# Patient Record
Sex: MALE | Race: WHITE | URBAN - METROPOLITAN AREA
[De-identification: names, ages, dates, MRNs, and addresses within clinical notes are randomized per-mention and may not be internally consistent; named-entity substitution may affect disease eponyms.]

---

## 2020-01-22 ENCOUNTER — OUTPATIENT (OUTPATIENT)
Dept: OUTPATIENT SERVICES | Facility: HOSPITAL | Age: 70
LOS: 1 days | End: 2020-01-22
Payer: MEDICARE

## 2020-01-22 DIAGNOSIS — G47.33 OBSTRUCTIVE SLEEP APNEA (ADULT) (PEDIATRIC): ICD-10-CM

## 2020-01-22 PROCEDURE — 95806 SLEEP STUDY UNATT&RESP EFFT: CPT

## 2020-01-22 PROCEDURE — G0399: CPT

## 2020-01-22 PROCEDURE — 95806 SLEEP STUDY UNATT&RESP EFFT: CPT | Mod: 26

## 2020-04-21 ENCOUNTER — EMERGENCY (EMERGENCY)
Facility: HOSPITAL | Age: 70
LOS: 0 days | Discharge: HOME | End: 2020-04-21
Attending: EMERGENCY MEDICINE
Payer: MEDICARE

## 2020-04-21 VITALS — HEIGHT: 66 IN | WEIGHT: 195.11 LBS

## 2020-04-21 VITALS
OXYGEN SATURATION: 98 % | HEART RATE: 78 BPM | SYSTOLIC BLOOD PRESSURE: 141 MMHG | RESPIRATION RATE: 17 BRPM | TEMPERATURE: 99 F | DIASTOLIC BLOOD PRESSURE: 77 MMHG

## 2020-04-21 DIAGNOSIS — N40.0 BENIGN PROSTATIC HYPERPLASIA WITHOUT LOWER URINARY TRACT SYMPTOMS: ICD-10-CM

## 2020-04-21 DIAGNOSIS — Z88.8 ALLERGY STATUS TO OTHER DRUGS, MEDICAMENTS AND BIOLOGICAL SUBSTANCES STATUS: ICD-10-CM

## 2020-04-21 DIAGNOSIS — E78.5 HYPERLIPIDEMIA, UNSPECIFIED: ICD-10-CM

## 2020-04-21 DIAGNOSIS — I62.00 NONTRAUMATIC SUBDURAL HEMORRHAGE, UNSPECIFIED: ICD-10-CM

## 2020-04-21 DIAGNOSIS — Z79.82 LONG TERM (CURRENT) USE OF ASPIRIN: ICD-10-CM

## 2020-04-21 DIAGNOSIS — R51 HEADACHE: ICD-10-CM

## 2020-04-21 LAB
ALBUMIN SERPL ELPH-MCNC: 3.7 G/DL — SIGNIFICANT CHANGE UP (ref 3.3–5)
ALP SERPL-CCNC: 73 U/L — SIGNIFICANT CHANGE UP (ref 40–120)
ALT FLD-CCNC: 42 U/L — SIGNIFICANT CHANGE UP (ref 12–78)
ANION GAP SERPL CALC-SCNC: 4 MMOL/L — LOW (ref 5–17)
APTT BLD: 31.4 SEC — SIGNIFICANT CHANGE UP (ref 27.5–36.3)
AST SERPL-CCNC: 25 U/L — SIGNIFICANT CHANGE UP (ref 15–37)
BASOPHILS # BLD AUTO: 0.04 K/UL — SIGNIFICANT CHANGE UP (ref 0–0.2)
BASOPHILS NFR BLD AUTO: 0.6 % — SIGNIFICANT CHANGE UP (ref 0–2)
BILIRUB SERPL-MCNC: 0.5 MG/DL — SIGNIFICANT CHANGE UP (ref 0.2–1.2)
BUN SERPL-MCNC: 19 MG/DL — SIGNIFICANT CHANGE UP (ref 7–23)
CALCIUM SERPL-MCNC: 9 MG/DL — SIGNIFICANT CHANGE UP (ref 8.5–10.1)
CHLORIDE SERPL-SCNC: 109 MMOL/L — HIGH (ref 96–108)
CO2 SERPL-SCNC: 27 MMOL/L — SIGNIFICANT CHANGE UP (ref 22–31)
CREAT SERPL-MCNC: 0.96 MG/DL — SIGNIFICANT CHANGE UP (ref 0.5–1.3)
EOSINOPHIL # BLD AUTO: 0.15 K/UL — SIGNIFICANT CHANGE UP (ref 0–0.5)
EOSINOPHIL NFR BLD AUTO: 2.1 % — SIGNIFICANT CHANGE UP (ref 0–6)
GLUCOSE SERPL-MCNC: 89 MG/DL — SIGNIFICANT CHANGE UP (ref 70–99)
HCT VFR BLD CALC: 45.8 % — SIGNIFICANT CHANGE UP (ref 39–50)
HGB BLD-MCNC: 15.4 G/DL — SIGNIFICANT CHANGE UP (ref 13–17)
IMM GRANULOCYTES NFR BLD AUTO: 0.7 % — SIGNIFICANT CHANGE UP (ref 0–1.5)
INR BLD: 1.1 RATIO — SIGNIFICANT CHANGE UP (ref 0.88–1.16)
LYMPHOCYTES # BLD AUTO: 1.64 K/UL — SIGNIFICANT CHANGE UP (ref 1–3.3)
LYMPHOCYTES # BLD AUTO: 23.3 % — SIGNIFICANT CHANGE UP (ref 13–44)
MCHC RBC-ENTMCNC: 32.1 PG — SIGNIFICANT CHANGE UP (ref 27–34)
MCHC RBC-ENTMCNC: 33.6 GM/DL — SIGNIFICANT CHANGE UP (ref 32–36)
MCV RBC AUTO: 95.4 FL — SIGNIFICANT CHANGE UP (ref 80–100)
MONOCYTES # BLD AUTO: 0.76 K/UL — SIGNIFICANT CHANGE UP (ref 0–0.9)
MONOCYTES NFR BLD AUTO: 10.8 % — SIGNIFICANT CHANGE UP (ref 2–14)
NEUTROPHILS # BLD AUTO: 4.4 K/UL — SIGNIFICANT CHANGE UP (ref 1.8–7.4)
NEUTROPHILS NFR BLD AUTO: 62.5 % — SIGNIFICANT CHANGE UP (ref 43–77)
PLATELET # BLD AUTO: 237 K/UL — SIGNIFICANT CHANGE UP (ref 150–400)
POTASSIUM SERPL-MCNC: 4.4 MMOL/L — SIGNIFICANT CHANGE UP (ref 3.5–5.3)
POTASSIUM SERPL-SCNC: 4.4 MMOL/L — SIGNIFICANT CHANGE UP (ref 3.5–5.3)
PROT SERPL-MCNC: 6.9 GM/DL — SIGNIFICANT CHANGE UP (ref 6–8.3)
PROTHROM AB SERPL-ACNC: 12.3 SEC — SIGNIFICANT CHANGE UP (ref 10–12.9)
RBC # BLD: 4.8 M/UL — SIGNIFICANT CHANGE UP (ref 4.2–5.8)
RBC # FLD: 12.7 % — SIGNIFICANT CHANGE UP (ref 10.3–14.5)
SODIUM SERPL-SCNC: 140 MMOL/L — SIGNIFICANT CHANGE UP (ref 135–145)
WBC # BLD: 7.04 K/UL — SIGNIFICANT CHANGE UP (ref 3.8–10.5)
WBC # FLD AUTO: 7.04 K/UL — SIGNIFICANT CHANGE UP (ref 3.8–10.5)

## 2020-04-21 PROCEDURE — 36415 COLL VENOUS BLD VENIPUNCTURE: CPT

## 2020-04-21 PROCEDURE — 70450 CT HEAD/BRAIN W/O DYE: CPT

## 2020-04-21 PROCEDURE — 85025 COMPLETE CBC W/AUTO DIFF WBC: CPT

## 2020-04-21 PROCEDURE — 99285 EMERGENCY DEPT VISIT HI MDM: CPT

## 2020-04-21 PROCEDURE — 93005 ELECTROCARDIOGRAM TRACING: CPT

## 2020-04-21 PROCEDURE — 80053 COMPREHEN METABOLIC PANEL: CPT

## 2020-04-21 PROCEDURE — 85610 PROTHROMBIN TIME: CPT

## 2020-04-21 PROCEDURE — 93010 ELECTROCARDIOGRAM REPORT: CPT

## 2020-04-21 PROCEDURE — 85730 THROMBOPLASTIN TIME PARTIAL: CPT

## 2020-04-21 PROCEDURE — 99283 EMERGENCY DEPT VISIT LOW MDM: CPT

## 2020-04-21 PROCEDURE — 70450 CT HEAD/BRAIN W/O DYE: CPT | Mod: 26

## 2020-04-21 PROCEDURE — 99291 CRITICAL CARE FIRST HOUR: CPT | Mod: 25

## 2020-04-21 NOTE — ED ADULT NURSE NOTE - OBJECTIVE STATEMENT
pt. c/o headache and incoordination since friday. pt. states he was trying to tie his shoes and get dressed and his hands were "not moving the way they normally do, it was like they wouldn't do what I was thinking." pt. states he had outpatient MRI of head today and was told he has bleeding in his brain and needs neurosurgery consult. pt. states he drove himself home from the radiology center and his wife drove him here, pt. is alert and oriented and states his HA has decreased and pain has improved.

## 2020-04-21 NOTE — ED ADULT TRIAGE NOTE - WEIGHT METHOD
HPI     2 wk return for Keratoconus, acute hydrops, OD  Pt states that OD is still the same since last exam. Pt denies any pain or   discomfort at this time. A few days ago OS was light sensitive and started   hurting but today it seems to be fine.      pt confirms using  PF QID OD  Timolol BID OD  Vinnie BID OD       Last edited by Karlie Bauer on 11/20/2019 11:10 AM. (History)            Assessment /Plan     For exam results, see Encounter Report.    Keratoconus, acute hydrops, right      Still with prominent hydrops OD  Photos taken today  Continue current treatment/medications                    stated

## 2020-04-21 NOTE — ED PROVIDER NOTE - NSFOLLOWUPINSTRUCTIONS_ED_ALL_ED_FT
Subdural Hematoma     A subdural hematoma is a collection of blood between the brain and its outer covering (dura). As the amount of blood increases, pressure builds on the brain.  There are two types of subdural hematomas:  Acute. This type develops shortly after a hard, direct hit to the head and causes blood to collect very quickly. This is a medical emergency. If it is not diagnosed and treated quickly, it can lead to severe brain injury or death.Chronic. This is when bleeding develops more slowly, over weeks or months. In some cases, this type does not cause symptoms.What are the causes?  This condition is caused by bleeding (hemorrhage) from a broken (ruptured) blood vessel. In most cases, a blood vessel ruptures and bleeds because of a head injury, such as from a hard, direct hit. Head injuries can happen in car accidents, falls, assaults, or while playing sports.  In rare cases, a hemorrhage can happen without a known cause (spontaneously), especially if you take blood thinners (anticoagulants).  What increases the risk?  This condition is more likely to develop in:  Older people.Infants.People who take blood thinners.People who have head injuries.People who abuse alcohol.What are the signs or symptoms?  Symptoms of this condition can vary depending on the size of the hematoma. Symptoms can be mild, severe, or life-threatening. They include:  Headaches.Nausea or vomiting.Changes in vision, such as double vision or loss of vision.Changes in speech or trouble understanding what people say.Loss of balance or trouble walking.Weakness, numbness, or tingling in the arms or legs, especially on one side of the body.Seizures.Change in personality.Increased sleepiness.Memory loss.Loss of consciousness.Coma.Symptoms of acute subdural hematoma can develop over minutes or hours. Symptoms of chronic subdural hematoma may develop over weeks or months.  How is this diagnosed?  This condition is diagnosed based on the results of:  A physical exam. Tests of strength, reflexes, coordination, senses, manner of walking (gait), and facial and eye movements (neurological exam). Imaging tests, such as an MRI or a CT scan.How is this treated?  Treatment for this condition depends on the type of hematoma and how severe it is.  Treatment for acute hematoma may include:  Emergency surgery to drain blood or remove a blood clot.Medicines that help the body get rid of excess fluids (diuretics). These may help to reduce pressure in the brain.Assisted breathing (ventilation).Treatment for chronic hematoma may include:  Observation and bed rest at the hospital.Surgery.If you take blood thinners, you may need to stop taking them for a short time. You may also be given anti-seizure (anticonvulsant) medicine.  Sometimes, no treatment is needed for chronic subdural hematoma.  Follow these instructions at home:  Activity     Avoid situations where you could injure your head again, such as in competitive sports, downhill snow sports, and horseback riding. Do not do these activities until your health care provider approves.  Wear protective gear, such as a helmet, when participating in activities such as biking or contact sports.Avoid too much visual stimulation while recovering. This means limiting how much you read and limiting your screen time on a smart phone, tablet, computer, or TV.Rest as told by your health care provider. Rest helps the brain heal.Try to avoid activities that cause physical or mental stress. Return to work or school as told by your health care provider.Do not lift anything that is heavier than 5 lb (2.3 kg), or the limit you are told, until your health care provider says that it is safe.Do not drive, ride a bike, or use heavy machinery until your health care provider approves.Always wear your seat belt when you are in a motor vehicle.Alcohol use     Do not drink alcohol if your health care provider tells you not to drink.If you drink alcohol, limit how much you use to:  0–1 drink a day for women.0–2 drinks a day for men.General instructions     Monitor your symptoms, and ask people around you to do the same. Recovery from brain injuries varies. Talk with your health care provider about what to expect.Take over-the-counter and prescription medicines only as told by your health care provider. Do not take blood thinners or NSAIDs unless your health care provider approves. These include aspirin, ibuprofen, naproxen, and warfarin.Keep your home environment safe to reduce the risk of falling.Keep all follow-up visits as told by your health care provider. This is important.Where to find more information  National [place] of Neurological Disorders and Stroke: www.ninds.nih.govAmerican Academy of Neurology (AAN): www.aan.comBrain Injury Association of [place]: www.beth.[facility] McDuffieGet help right away if you:  Are taking blood thinners and you fall or you experience minor trauma to the head. If you take any blood thinners, even a very small injury can cause a subdural hematoma.Have a bleeding disorder and you fall or you experience minor trauma to the head.Develop any of the following symptoms after a head injury:  Clear fluid draining from your nose or ears.Nausea or vomiting.Changes in speech or trouble understanding what people say.Seizures.Drowsiness or a decrease in alertness.Double vision.Numbness or inability to move (paralysis) in any part of your body.Difficulty walking or poor coordination.Difficulty thinking.Confusion or forgetfulness.Personality changes.Irrational or aggressive behavior.These symptoms may represent a serious problem that is an emergency. Do not wait to see if the symptoms will go away. Get medical help right away. Call your local emergency services (911 in the [place] Do not drive yourself to the hospital.   Summary  A subdural hematoma is a collection of blood between the brain and its outer covering (dura).Treatment for this condition depends on what type of subdural hematoma you have and how severe it is.Symptoms can vary from mild to severe to life-threatening.Monitor your symptoms, and ask others around you to do the same.This information is not intended to replace advice given to you by your health care provider. Make sure you discuss any questions you have with your health care provider.

## 2020-04-21 NOTE — ED PROVIDER NOTE - PATIENT PORTAL LINK FT
You can access the FollowMyHealth Patient Portal offered by [facility] by registering at the following website: [email or web address]. By joining [employer]’s FollowMyHealth portal, you will also be able to view your health information using other applications (apps) compatible with our system.

## 2020-04-21 NOTE — ED ADULT NURSE NOTE - CHIEF COMPLAINT QUOTE
pt sent to ED by MD Rebollar for neuro consult s/p MRI of head this morning showing + R. sided bleed. pt reports "very bad HA" 4 days ago and a fall x "a few weeks ago." reports he went for the MRI because his "L. arm was not acting like it was part of his body" but has been resolving. Pt AOx4 in triage, on 81 ASA, took this morning. denies HA and vision changes.

## 2020-04-21 NOTE — ED PROVIDER NOTE - CLINICAL SUMMARY MEDICAL DECISION MAKING FREE TEXT BOX
Right sided subdural hematoma with 1.6 cm leftward shift, mild LUE pronator drift for x4 days. Pt otherwise with normal neuro exam and very well appearing. Neurosurgery recommends interval 4 hour CT head. Right sided subdural hematoma with 1.6 cm leftward shift, mild LUE pronator drift for x4 days. Pt otherwise with normal neuro exam and very well appearing. Neurosurgery recommends discharge and outpatient CT head. Right sided subdural hematoma with 1.6 cm leftward shift, mild LUE fine motor impairment x4 days. Pt otherwise with normal neuro exam and very well appearing. Neurosurgery recommends discharge and outpatient CT head.

## 2020-04-21 NOTE — ED PROVIDER NOTE - CARE PROVIDER_API CALL
Avinash Rosenthal; PhD)  Neurosurgery  [address] [facility] [address] [place] [address]  Phone: [phone]  Fax: [phone]  Follow Up Time:

## 2020-04-21 NOTE — ED PROVIDER NOTE - OBJECTIVE STATEMENT
Pertinent HPI/PMH/PSH/FHx/SHx and Review of Systems contained within  HPI: 70 y/o male sent in by neurologist for neurosurgery consult for right subdural hematoma. Pt had a headache x4 days ago. Pt reports he went for the MRI because his "L. arm was not acting like it was part of his body" but has been resolving. Left hand seems to internally rotate itself. Pt fell about 2-3 weeks ago, denies hitting head at that time.   PMH/PSH relevant for: HLD, on 81 mg aspirin and BPH  ROS negative for: fever, Chest pain, SOB, Nausea, vomiting, diarrhea, abdominal pain, dysuria    FamilyHx and SocialHx: pt is a  Pertinent HPI/PMH/PSH/FHx/SHx and Review of Systems contained within  HPI: 68 y/o male sent in by neurologist for neurosurgery consult for right subdural hematoma. Pt had a headache x4 days ago. Pt reports he went for the MRI because his "L. arm was not acting like it was part of his body" but has been resolving. Left hand seems to internally rotate itself & "have a mind of its own". Pt fell about 2-3 weeks ago, denies hitting head at that time.   PMH/PSH relevant for: HLD, on 81 mg aspirin and BPH  ROS negative for: fever, Chest pain, SOB, Nausea, vomiting, diarrhea, abdominal pain, dysuria    FamilyHx and SocialHx: pt is a

## 2020-04-21 NOTE — ED PROVIDER NOTE - PHYSICAL EXAMINATION
*GEN: No acute distress, well appearing   *HEAD: Normocephalic, Atraumatic  *EYES/NOSE: b/l Pupils symmetric & Reactive to ligth, EOMI b/l  *THROAT: airway patent, moist mucous membranes  *NECK: Neck supple  *PULMONARY: No Respiratory distress, symmetric b/l chest rise  *CARDIAC: s1s2, regular rhythm   *ABDOMEN:  Non Tender, Non Distended, soft, no guarding, no rebound, no masses   *BACK: no CVA tenderness, No midline vertebral tenderness to palpation   *EXTREMITIES: symmetric pulses, 2+ DP & radial pulses, no cyanosis, no edema   *SKIN: no rash, no bruising   *NEUROLOGIC: CN 2-12 intact, normal finger to nose & heel to shin; no dysdiadochokinesis; equal & normal strength & sensation in b/l UE/LE; full active & passive ROM in all extremities, +mild pronator drift LUE, 5/5 strength LUE, normal patellar reflex, normal gait, romberg sign negative,  *PSYCH: appropriate concern about symptoms, pleasant *GEN: No acute distress, well appearing   *HEAD: Normocephalic, Atraumatic  *EYES/NOSE: b/l Pupils symmetric & Reactive to ligth, EOMI b/l  *THROAT: airway patent, moist mucous membranes  *NECK: Neck supple  *PULMONARY: No Respiratory distress, symmetric b/l chest rise  *CARDIAC: s1s2, regular rhythm   *ABDOMEN:  Non Tender, Non Distended, soft, no guarding, no rebound, no masses   *BACK: no CVA tenderness, No midline vertebral tenderness to palpation   *EXTREMITIES: symmetric pulses, 2+ DP & radial pulses, no cyanosis, no edema   *SKIN: no rash, no bruising   *NEUROLOGIC: CN 2-12 intact, normal finger to nose & heel to shin; no dysdiadochokinesis; equal & normal strength & sensation in b/l UE/LE; full active & passive ROM in all extremities, no pronator drift 5/5 strength LUE, normal patellar reflex, normal gait, romberg sign negative, L UE scant independent upward motion with eyes closed, no drift no weakness.  challenges with fine motor LUE hand only  *PSYCH: appropriate concern about symptoms, pleasant

## 2020-04-21 NOTE — ED PROVIDER NOTE - PROGRESS NOTE DETAILS
Patient seen and evaluated.  States had outpatient MRI today and was sent in by neurologist, Dr. Pacheco, as he had a bleed visualized on imaging.  He had a HA 5 days ago and noticed his left arm and leg were not functioning as normal, but were still moving.  Overall this symptom has been improving.  Case d/w neurosurgery who were aware of the patient and will be down to see him -Gisell Tijerina PA-C Patient seen here by Dr. Rosenthal from neurosurgery, he clears the patient for d/c and close follow up in the office.  Strict return precautions reviewed -Gisell Tijerina PA-C

## 2020-04-21 NOTE — ED PROVIDER NOTE - ATTENDING CONTRIBUTION TO CARE
Attending Attestation:  Reese Back MD,  I have personally performed a history and physical examination of the patient and discussed management with the ACP as well as the patient.  I reviewed the ACP's note and agree with the documented findings and plan of care.  I have authored and modified critical sections of the Provider Note, including but not limited to HPI, ROS, Physical Exam and MDM.

## 2020-04-21 NOTE — ED PROVIDER NOTE - CARE PLAN
Principal Discharge DX:	Subdural hematoma Principal Discharge DX:	Subdural hematoma  Secondary Diagnosis:	Midline shift of brain due to hematoma  Secondary Diagnosis:	Headache  Secondary Diagnosis:	Fine motor impairment

## 2020-04-21 NOTE — ED ADULT TRIAGE NOTE - CHIEF COMPLAINT QUOTE
pt sent to ED by MD Rebollar for neuro consult s/p MRI of head this morning showing + R. sided bleed. pt reports "very bad HA" 4 days ago and a fall x "a few weeks ago." reports he went for the MRI because his "arm was not acting like it was part of his body" but has been resolving. Pt AOx4 in triage, on 81 ASA, took this morning. denies HA and vision changes. pt sent to ED by MD Rebollar for neuro consult s/p MRI of head this morning showing + R. sided bleed. pt reports "very bad HA" 4 days ago and a fall x "a few weeks ago." reports he went for the MRI because his "L. arm was not acting like it was part of his body" but has been resolving. Pt AOx4 in triage, on 81 ASA, took this morning. denies HA and vision changes.

## 2020-04-21 NOTE — CONSULT NOTE ADULT - SUBJECTIVE AND OBJECTIVE BOX
Neurosurgery:    69M evaluated by Dr. Crawford (Neurologist) after MRI study complete.  Pt c/o HA's.  Pt sent to ED for further eval.  Awaits CTH.  Neurologically bright, alert, oriented, intact.    PAST MEDICAL & SURGICAL HISTORY:      FAMILY HISTORY:  Non-contributory     Social Hx:    No Tob, No EtOH    Allergies  hydrocodone (Hives)          MEDICATIONS  (outpt) pending       ROS: Pertinent positives in HPI, all other ROS were reviewed and are negative.     Review of Symptoms  	Gen: no fevers, chills, sweats, weight loss, fatigue  	Visual: no recent changes in vision, no blurriness, no seeing spots  	Cardiovascular: no chest pain, no palpitations, no orthopnea, no leg swelling  	Respiratory: no shortness of breath, no exertional dyspnea, no cough, no rhinorrhea, no nasal congestion  	GI: no difficulty swallowing, no nausea, no vomiting, no abdominal pain, no diarrhea, no constipation, no melana  	: no dysuria, no increased freq, no hematuria, no malodorous urine  	Derm: no wounds, no rashes  	Heme: no easy bleeding or bruising  	MSK: no joint pain, no joint swelling or redness, no extremity pain               Neuro: + headache, L arm?+weakness, no memory loss     Vital Signs Last 24 Hrs  T(C): 37.1 (21 Apr 2020 16:13), Max: 37.1 (21 Apr 2020 16:13)  T(F): 98.7 (21 Apr 2020 16:13), Max: 98.7 (21 Apr 2020 16:13)  HR: 77 (21 Apr 2020 16:13) (77 - 77)  BP: 150/85 (21 Apr 2020 16:13) (150/85 - 150/85)  BP(mean): 99 (21 Apr 2020 16:13) (99 - 99)  RR: 18 (21 Apr 2020 16:13) (18 - 18)  SpO2: 100% (21 Apr 2020 16:13) (100% - 100%)    Labs:                        15.4   7.04  )-----------( 237      ( 21 Apr 2020 16:36 )             45.8     04-21    140  |  109<H>  |  19  ----------------------------<  89  4.4   |  27  |  0.96    Ca    9.0      21 Apr 2020 16:36    TPro  6.9  /  Alb  3.7  /  TBili  0.5  /  DBili  x   /  AST  25  /  ALT  42  /  AlkPhos  73  04-21    PT/INR - ( 21 Apr 2020 16:36 )   PT: 12.3 sec;   INR: 1.10 ratio    PTT - ( 21 Apr 2020 16:36 )  PTT:31.4 sec    Radiology report:  - CT head:  Pending    - MRI Right holohemispheric SDH.  Non compressive, no shift.    Physical Exam:  Constitutional: Awake / alert  HEENT: PERRLA, EOMI  Neck: Supple  Respiratory: Breath sounds are clear bilaterally  Cardiovascular: S1 and S2, regular rhythm  Gastrointestinal: Soft, NT/ND  Extremities:  no edema  Vascular: No carotid Bruit  Musculoskeletal: no joint swelling/tenderness, no abnormal movements  Skin: No rashes    Neurological Exam:  HF: A x O x 3, appropriately interactive, normal affect, speech fluent, no aphasia or paraphasic errors. Naming /repetition intact   CN: PERRL, EOMI, VFF, facial sensation normal, no NLFD, tongue midline  Motor: No pronator drift, Strength 5/5 in all 4 ext, normal bulk and tone, no tremor, rigidity or bradykinesia  Sens: Intact to light touch  Reflexes: Symmetric and normal, downgoing toes b/l  Coord:  No FNFA, dysmetria, JESUS intact   Gait/Balance: Cannot test    A/P:  69M with incidental moderate Right SDH.  No shift, minor mass effect - non surgical at this time.    - Case d/w Dr. Hidalgo - attending - no acute neurosurgical intervention  - Avoid Motrin / Ibuprofen / NSAIDS.  If pt is on ASA please Hold ASA / Blood thinners until outpt follow up in 1-2 weeks  - recommend repeat CTH prior to 2 week f/u with Dr. Rosenthal or Dr. Rodgers  - Would repeat CTH in 4 hours prior to d/c from ED to ensure no further SDH enlargement.  - Avoid aggressive activity, walking encouraged and ok both indoors and outdoors.  - No antiszr meds required at this time        Care Time spent > 42 minutes in evaluating patient, medical record, imaging studies and implementing neuroprotective care plan during acute phase of care. Neurosurgery:    69M evaluated by Dr. Crawford (Neurologist) after MRI study complete.  Pt c/o HA's.  Pt sent to ED for further eval.  Awaits CTH.  Neurologically bright, alert, oriented, intact.    PAST MEDICAL & SURGICAL HISTORY:      FAMILY HISTORY:  Non-contributory     Social Hx:    No Tob, No EtOH    Allergies  hydrocodone (Hives)      MEDICATIONS  (outpt) pending       ROS: Pertinent positives in HPI, all other ROS were reviewed and are negative.     Review of Symptoms  	Gen: no fevers, chills, sweats, weight loss, fatigue  	Visual: no recent changes in vision, no blurriness, no seeing spots  	Cardiovascular: no chest pain, no palpitations, no orthopnea, no leg swelling  	Respiratory: no shortness of breath, no exertional dyspnea, no cough, no rhinorrhea, no nasal congestion  	GI: no difficulty swallowing, no nausea, no vomiting, no abdominal pain, no diarrhea, no constipation, no melana  	: no dysuria, no increased freq, no hematuria, no malodorous urine  	Derm: no wounds, no rashes  	Heme: no easy bleeding or bruising  	MSK: no joint pain, no joint swelling or redness, no extremity pain               Neuro: + headache, L arm?+weakness, no memory loss     Vital Signs Last 24 Hrs  T(C): 37.1 (21 Apr 2020 16:13), Max: 37.1 (21 Apr 2020 16:13)  T(F): 98.7 (21 Apr 2020 16:13), Max: 98.7 (21 Apr 2020 16:13)  HR: 77 (21 Apr 2020 16:13) (77 - 77)  BP: 150/85 (21 Apr 2020 16:13) (150/85 - 150/85)  BP(mean): 99 (21 Apr 2020 16:13) (99 - 99)  RR: 18 (21 Apr 2020 16:13) (18 - 18)  SpO2: 100% (21 Apr 2020 16:13) (100% - 100%)    Labs:                        15.4   7.04  )-----------( 237      ( 21 Apr 2020 16:36 )             45.8     04-21    140  |  109<H>  |  19  ----------------------------<  89  4.4   |  27  |  0.96    Ca    9.0      21 Apr 2020 16:36    TPro  6.9  /  Alb  3.7  /  TBili  0.5  /  DBili  x   /  AST  25  /  ALT  42  /  AlkPhos  73  04-21    PT/INR - ( 21 Apr 2020 16:36 )   PT: 12.3 sec;   INR: 1.10 ratio    PTT - ( 21 Apr 2020 16:36 )  PTT:31.4 sec    Radiology report:  - CT head:  Pending    - MRI Right holohemispheric SDH.  Non compressive, no shift.    Physical Exam:  Constitutional: Awake / alert  HEENT: PERRLA, EOMI  Neck: Supple  Respiratory: Breath sounds are clear bilaterally  Cardiovascular: S1 and S2, regular rhythm  Gastrointestinal: Soft, NT/ND  Extremities:  no edema  Vascular: No carotid Bruit  Musculoskeletal: no joint swelling/tenderness, no abnormal movements  Skin: No rashes    Neurological Exam:  HF: A x O x 3, appropriately interactive, normal affect, speech fluent, no aphasia or paraphasic errors. Naming /repetition intact   CN: PERRL, EOMI, VFF, facial sensation normal, no NLFD, tongue midline  Motor: No pronator drift, Strength 5/5 in all 4 ext, normal bulk and tone, no tremor, rigidity or bradykinesia  Sens: Intact to light touch  Reflexes: Symmetric and normal, downgoing toes b/l  Coord:  No FNFA, dysmetria, JESUS intact   Gait/Balance: Cannot test    A/P:  69M with incidental moderate Right SDH.  No shift, minor mass effect - non surgical at this time.    - Case d/w Dr. Rosenthal - attending - will see in ED  - Avoid Motrin / Ibuprofen / NSAIDS.  If pt is on ASA please Hold ASA / Blood thinnerss  - Would repeat CTH in 4 hours to ensure no further SDH enlargement.  - Avoid aggressive activity, walking encouraged and ok both indoors and outdoors.  - No antiszr meds required at this time        Care Time spent > 42 minutes in evaluating patient, medical record, imaging studies and implementing neuroprotective care plan during acute phase of care. Neurosurgery:    68 y/o male sent in by neurologist for neurosurgery consult for right subdural hematoma. Pt had a headache x4 days ago. Pt reports he went for the MRI because his "L. arm was not acting like it was part of his body" but has been resolving. Left hand seems to internally rotate itself. Pt fell about 2-3 weeks ago, denies hitting head at that time.  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  Pt's HAs also improved.  CTH demonstrates chronic SDH with membrains, mod mass effect, min 2mm shift.  Dr. Rosenthal reviewed films and will follow up with pt as outpt.  Presently, pt agreeable to plan with outpt CTH in 1-2 weeks.  Pt will stop ASA use.   Neurologically bright, alert, oriented, intact.       	PMH/PSH relevant for: HLD, on 81 mg aspirin and BPH  	ROS negative for: fever, Chest pain, SOB, Nausea, vomiting, diarrhea, abdominal pain, dysuria      FamilyHx: Noncon    SocialHx: pt is a     Vital Signs Last 24 Hrs  T(C): 37.1 (21 Apr 2020 16:13), Max: 37.1 (21 Apr 2020 16:13)  T(F): 98.7 (21 Apr 2020 16:13), Max: 98.7 (21 Apr 2020 16:13)  HR: 77 (21 Apr 2020 16:13) (77 - 77)  BP: 150/85 (21 Apr 2020 16:13) (150/85 - 150/85)  BP(mean): 99 (21 Apr 2020 16:13) (99 - 99)  RR: 18 (21 Apr 2020 16:13) (18 - 18)  SpO2: 100% (21 Apr 2020 16:13) (100% - 100%)    Labs:                        15.4   7.04  )-----------( 237      ( 21 Apr 2020 16:36 )             45.8     04-21    140  |  109<H>  |  19  ----------------------------<  89  4.4   |  27  |  0.96    Ca    9.0      21 Apr 2020 16:36    TPro  6.9  /  Alb  3.7  /  TBili  0.5  /  DBili  x   /  AST  25  /  ALT  42  /  AlkPhos  73  04-21    PT/INR - ( 21 Apr 2020 16:36 )   PT: 12.3 sec;   INR: 1.10 ratio    PTT - ( 21 Apr 2020 16:36 )  PTT:31.4 sec    Radiology report:  - CT head:    1)  as reported by outside MR, there is a right-sided subdural hematoma. There is a right frontal mixed density collection which is likely subacute with episodic rebleeding. This measures approximately 1.6 cm, with leftward shift.  2)  resultant gyral and sulcal effacement without evidence of an acute intraparenchymal infarct orhemorrhage.    - MRI Right holohemispheric SDH.  Non compressive, no shift.    Physical Exam:  Constitutional: Awake / alert  HEENT: PERRLA, EOMI  Neck: Supple  Respiratory: Breath sounds are clear bilaterally  Cardiovascular: S1 and S2, regular rhythm  Gastrointestinal: Soft, NT/ND  Extremities:  no edema  Vascular: No carotid Bruit  Musculoskeletal: no joint swelling/tenderness, no abnormal movements  Skin: No rashes    Neurological Exam:  L UE scant independent upward motion with eyes closed, no drift no weakness.  challenges with fine motor LUE hand only.  HF: A x O x 3, appropriately interactive, normal affect, speech fluent, no aphasia or paraphasic errors. Naming /repetition intact   CN: PERRL, EOMI, VFF, facial sensation normal, no NLFD, tongue midline  Motor: No pronator drift, Strength 5/5 in all 4 ext, normal bulk and tone, no tremor, rigidity or bradykinesia  Sens: Intact to light touch  Reflexes: Symmetric and normal, downgoing toes b/l  Coord:  No FNFA, dysmetria, JESUS intact   Gait/Balance: Cannot test    A/P:  69M with incidental moderate Right SDH.  Minor shift, mod mass effect - non surgical at this time.  Per Dr. Rosenthal after evaluating pt directly - ok to d/c home and plan for outpt follow up in 1-2 weeks with repeat CTH.  No ASA during this time.    - Case d/w Dr. Rosenthal - attending - will see in ED  - Avoid Motrin / Ibuprofen / NSAIDS.  If pt is on ASA please Hold ASA / Blood thinnerss  - Would repeat CTH in 1-2 weeks to ensure no further SDH enlargement.  - Avoid aggressive activity, walking encouraged and ok both indoors and outdoors.  - No antiszr meds required at this time        Care Time spent > 42 minutes in evaluating patient, medical record, imaging studies and implementing neuroprotective care plan during acute phase of care.

## 2020-04-21 NOTE — ED PROVIDER NOTE - NS ED ROS FT
Review of Systems:  	•	CONSTITUTIONAL: no fever  	•	SKIN: no rash  	•	RESPIRATORY: no shortness of breath  	•	CARDIAC: no chest pain  	•	GI:  no abd pain, no nausea, no vomiting, no diarrhea  	•	GENITO-URINARY:  no dysuria  	•	MUSCULOSKELETAL:  no back pain  	•	NEUROLOGIC: no weakness  	•	ALLERGY: no rhinorrhea  	•	PSYSCHIATRIC: appropriate concern about symptoms

## 2020-04-22 PROBLEM — Z00.00 ENCOUNTER FOR PREVENTIVE HEALTH EXAMINATION: Status: ACTIVE | Noted: 2020-04-22

## 2020-04-29 ENCOUNTER — APPOINTMENT (OUTPATIENT)
Dept: CT IMAGING | Facility: CLINIC | Age: 70
End: 2020-04-29
Payer: MEDICARE

## 2020-04-29 ENCOUNTER — TRANSCRIPTION ENCOUNTER (OUTPATIENT)
Age: 70
End: 2020-04-29

## 2020-04-29 ENCOUNTER — APPOINTMENT (OUTPATIENT)
Dept: NEUROSURGERY | Facility: CLINIC | Age: 70
End: 2020-04-29
Payer: MEDICARE

## 2020-04-29 ENCOUNTER — OUTPATIENT (OUTPATIENT)
Dept: OUTPATIENT SERVICES | Facility: HOSPITAL | Age: 70
LOS: 1 days | End: 2020-04-29
Payer: MEDICARE

## 2020-04-29 DIAGNOSIS — Z00.8 ENCOUNTER FOR OTHER GENERAL EXAMINATION: ICD-10-CM

## 2020-04-29 PROCEDURE — 70450 CT HEAD/BRAIN W/O DYE: CPT

## 2020-04-29 PROCEDURE — 70450 CT HEAD/BRAIN W/O DYE: CPT | Mod: 26

## 2020-04-29 PROCEDURE — 99213 OFFICE O/P EST LOW 20 MIN: CPT | Mod: 95

## 2020-04-29 RX ORDER — ATORVASTATIN CALCIUM 20 MG/1
20 TABLET, FILM COATED ORAL
Qty: 30 | Refills: 0 | Status: ACTIVE | COMMUNITY
Start: 2020-04-04

## 2020-04-29 RX ORDER — FINASTERIDE 5 MG/1
5 TABLET, FILM COATED ORAL
Qty: 90 | Refills: 0 | Status: ACTIVE | COMMUNITY
Start: 2020-03-30

## 2020-04-29 RX ORDER — ALFUZOSIN HYDROCHLORIDE 10 MG/1
10 TABLET, EXTENDED RELEASE ORAL
Qty: 90 | Refills: 0 | Status: ACTIVE | COMMUNITY
Start: 2020-02-27

## 2020-05-03 NOTE — HISTORY OF PRESENT ILLNESS
[Home] : at home, [unfilled] , at the time of the visit. [Medical Office: ([facility])___] : at the medical office located in  [Patient] : the patient

## 2020-05-04 NOTE — CONSULT LETTER
[Dear  ___] : Dear  [unfilled], [Sincerely,] : Sincerely, [FreeTextEntry3] : Avinash Rosenthal MD, PhD, FRCPSC\par  of Neurosurgery\par [facility]\par  of Neurosurgery\par [facility] \par [address] [place] [address], [address] Floor\[place] [address]\par Tel: [phone]\par FAX: [phone]\par \par \par  [FreeTextEntry1] : This visit was conducted via a Telehealth platform provided by [employer] using real-time 2 way audiovisual technology. 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 emergency room on 23 April 2020 because of headache and left arm and hand weakness and numbness.  The patient was determined to be COVID-19 negative.  The patient recalls a fall approximately 4 weeks ago.  Initially he had no symptoms.  But the gentleman sought medical attention because of increasing symptom profile.  CT imaging of the brain revealed a significant subacute subdural hematoma over the right hemisphere with some compression of the frontal and parietal cortex in particular.  There is a minimal amount of midline shift.  The patient was alert and able to communicate properly.  The patient had very minor numbness and tingling and minimal weakness of the left arm and hand.  Because of the intensity of COVID-19 patient census at [facility] the team felt it was best for the patient to continue to recover in his home environment with close follow-up.  The patient has now undergone a CT scan at a 1 week interval to evaluate the evolution of the subdural hematoma.  Since being discharged home the patient indicates that his headaches have resolved.  He no longer has a sense of tingling or numbness in the left side of his body.  There have been no episodes suspicious for seizure.  He denies any nausea or vomiting.  He has not had any fever or chills.  He has not had any shortness of breath.  His balance is normal.\par \par I have reviewed with the patient the results of his most recent CT scan.  The collection is slightly smaller and certainly is homogeneous and hypo-dense consistent now with a chronic evolving subdural hematoma.\par \par On examination the patient is alert and oriented.  The patient demonstrates conjugate eye gaze.  The patient's neck is supple.  The face is symmetric.  There is no pronator drift.  The patient demonstrates intact bilateral strength of the arms and legs.\par \par At this early interval I believe the patient is improving consistent with the natural history of a chronic subdural hematoma.  I do have some mild concerns regarding the degree of compression of the cortex over the hemisphere.  I discussed with the patient that it is appropriate for him to undergo a further CT scan in 4 to 6 weeks.  I anticipate that the subdural should continue to improve.  However, in the setting of persistent mass-effect it would be most appropriate to consider placement of a bur hole and drainage of the subdural.  The patient also is aware to contact me at any time should he have increasing symptoms once again including numbness, weakness, or increasing headaches.  The patient indicated good understanding.  I will see the patient in my office following the next CT scan.  We are anticipating being able to conduct further visits in person in a 4-week interval.  I have recommended that the patient hold off on taking prophylactic aspirin for a further week.\par \par Thank you for very kindly including me in the evaluation and treatment of your patient.  Please do not hesitate to contact me should you have any questions or concerns regarding this evaluation, the patient's diagnosis of a subdural hematoma, or his ongoing follow-up care plan.\par \par \par

## 2020-06-01 ENCOUNTER — OUTPATIENT (OUTPATIENT)
Dept: OUTPATIENT SERVICES | Facility: HOSPITAL | Age: 70
LOS: 1 days | End: 2020-06-01
Payer: MEDICARE

## 2020-06-01 ENCOUNTER — APPOINTMENT (OUTPATIENT)
Dept: CT IMAGING | Facility: CLINIC | Age: 70
End: 2020-06-01
Payer: MEDICARE

## 2020-06-01 ENCOUNTER — APPOINTMENT (OUTPATIENT)
Dept: NEUROSURGERY | Facility: CLINIC | Age: 70
End: 2020-06-01
Payer: MEDICARE

## 2020-06-01 VITALS
OXYGEN SATURATION: 97 % | TEMPERATURE: 99.1 F | WEIGHT: 195 LBS | HEART RATE: 72 BPM | BODY MASS INDEX: 31.34 KG/M2 | HEIGHT: 66 IN | SYSTOLIC BLOOD PRESSURE: 145 MMHG | DIASTOLIC BLOOD PRESSURE: 94 MMHG

## 2020-06-01 DIAGNOSIS — Z00.8 ENCOUNTER FOR OTHER GENERAL EXAMINATION: ICD-10-CM

## 2020-06-01 DIAGNOSIS — S06.5X9A TRAUMATIC SUBDURAL HEMORRHAGE WITH LOSS OF CONSCIOUSNESS OF UNSPECIFIED DURATION, INITIAL ENCOUNTER: ICD-10-CM

## 2020-06-01 PROCEDURE — 70450 CT HEAD/BRAIN W/O DYE: CPT | Mod: 26

## 2020-06-01 PROCEDURE — 70450 CT HEAD/BRAIN W/O DYE: CPT

## 2020-06-01 PROCEDURE — 99212 OFFICE O/P EST SF 10 MIN: CPT

## 2020-06-03 PROBLEM — S06.5X9A SUBDURAL HEMATOMA: Status: ACTIVE | Noted: 2020-04-22

## 2020-06-03 NOTE — CONSULT LETTER
[Dear  ___] : Dear  [unfilled], [Sincerely,] : Sincerely, [FreeTextEntry2] : Clau Jones MD\par [address] [facility]\par [place] [id number] [FreeTextEntry3] : Avinash Rosenthal MD, PhD, FRCPSC \par Attending Neurosurgeon \par  of Neurosurgery \par [facility] \par [address], [address]nd floor \[place] [address] \par Office: [phone] \par Fax: [phone]\par \par  [FreeTextEntry1] : I have seen your patient Scar Schneider in my office for further follow-up evaluation of right sided subdural hematoma.  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  Shortly thereafter he developed weakness and tingling of the left arm in particular.  Because of concerns of possible stroke he underwent imaging studies at [facility].  The patient was evaluated by his neurologist and was determined to have an acute right hemisphere acute subdural hematoma with mass-effect.  We evaluated the patient in [facility] emergency room on 23 April 2020.  The patient did in fact have some mild weakness of the left arm but he was cognitively intact and stable with respect to ambulation.  Because of COVID-19 pandemic triage and the fact that the patient was nonsurgical he was discharged home.  The patient continued to improve in his home environment and we saw the patient I did 2-week interval where his strength was dramatically improved to near normal.  His headaches were also improving.  However, there was some concerns that he may be developing an expanding hygroma over the right hemisphere.  The patient therefore returns today after undergoing a further CT scan of the brain.\par \par Since we saw the patient previously he indicates that his headaches have resolved completely.  He no longer experiences any weakness or numbness affecting the left side of his body.  He reports no balance issues.  There have been no events suspicious for seizure.\par \par I have reviewed with the patient directly both his imaging studies from [facility] as well as the current CT scan of the head.  There has been very good resolution of the acute collection of blood over the right hemisphere.  There is still a small amount of resolving subacute subdural.  However, there is clear evidence of decreased mass-effect and the sulci and gyri over the right hemisphere are expanding.  The midline shift is resolving and minimal at this point.\par \par On examination the patient is alert and oriented.  His cranial nerve examination is within normal parameters.  In particular, visual fields are intact.  Pupils are equal and reactive to light.  There is no nystagmus.  Face sensation and movement are symmetric and normal.  The tongue protrudes in the midline.  Voice quality and swallow mechanism are normal.  There is no pronator drift.  Romberg test is negative.  The patient now demonstrates symmetric strength in both upper extremities.  Sensation is also normal to light touch.\par \par I am very pleased with the patient's recovery from this acute subdural hematoma.  I have reassured the patient that this point there is no concern for need to perform surgical intervention.  The patient may now resume anticoagulation treatment or daily prophylactic aspirin if needed or recommended by his primary care physician.  At this point in time I have not arranged for any further follow-up but I am more than pleased to see the patient again at any time should the need arise.\par \par Thank you for very kindly including me in the evaluation and treatment of your patient.  Please do not hesitate to contact me should you have any questions or concerns regarding the patient's recent diagnosis of a right hemisphere subdural hematoma, the conservative approach to treatment, or his ongoing follow-up care.

## 2020-07-07 ENCOUNTER — EMERGENCY (EMERGENCY)
Facility: HOSPITAL | Age: 70
LOS: 0 days | Discharge: HOME | End: 2020-07-07
Payer: MEDICARE

## 2020-07-07 VITALS
DIASTOLIC BLOOD PRESSURE: 76 MMHG | RESPIRATION RATE: 16 BRPM | HEART RATE: 62 BPM | SYSTOLIC BLOOD PRESSURE: 126 MMHG | OXYGEN SATURATION: 99 % | TEMPERATURE: 98 F

## 2020-07-07 DIAGNOSIS — Z88.5 ALLERGY STATUS TO NARCOTIC AGENT: ICD-10-CM

## 2020-07-07 DIAGNOSIS — Z20.828 CONTACT WITH AND (SUSPECTED) EXPOSURE TO OTHER VIRAL COMMUNICABLE DISEASES: ICD-10-CM

## 2020-07-07 DIAGNOSIS — Z11.59 ENCOUNTER FOR SCREENING FOR OTHER VIRAL DISEASES: ICD-10-CM

## 2020-07-07 PROCEDURE — 99282 EMERGENCY DEPT VISIT SF MDM: CPT

## 2020-07-07 PROCEDURE — 99283 EMERGENCY DEPT VISIT LOW MDM: CPT

## 2020-07-07 PROCEDURE — U0003: CPT

## 2020-07-07 PROCEDURE — 99283 EMERGENCY DEPT VISIT LOW MDM: CPT | Mod: CS

## 2020-07-07 NOTE — ED STATDOCS - PATIENT PORTAL LINK FT
You can access the FollowMyHealth Patient Portal offered by Health system by registering at the following website: [email or web address]. By joining [employer]’s FollowMyHealth portal, you will also be able to view your health information using other applications (apps) compatible with our system.

## 2020-07-07 NOTE — ED STATDOCS - OBJECTIVE STATEMENT
69 yr. old male PMH: Denied presents to ED with no  fever, no  cough, no runny nose, no body aches, no sore throat . Pt recently exposed to COVID-19. Pt here for testing.

## 2020-07-08 LAB — SARS-COV-2 RNA SPEC QL NAA+PROBE: SIGNIFICANT CHANGE UP

## 2020-07-13 ENCOUNTER — APPOINTMENT (OUTPATIENT)
Dept: NEUROSURGERY | Facility: CLINIC | Age: 70
End: 2020-07-13

## 2020-07-22 ENCOUNTER — EMERGENCY (EMERGENCY)
Facility: HOSPITAL | Age: 70
LOS: 0 days | Discharge: HOME | End: 2020-07-22
Payer: MEDICARE

## 2020-07-22 VITALS
HEART RATE: 72 BPM | OXYGEN SATURATION: 97 % | SYSTOLIC BLOOD PRESSURE: 130 MMHG | RESPIRATION RATE: 18 BRPM | TEMPERATURE: 99 F | DIASTOLIC BLOOD PRESSURE: 73 MMHG

## 2020-07-22 DIAGNOSIS — Z86.19 PERSONAL HISTORY OF OTHER INFECTIOUS AND PARASITIC DISEASES: ICD-10-CM

## 2020-07-22 DIAGNOSIS — Z88.5 ALLERGY STATUS TO NARCOTIC AGENT: ICD-10-CM

## 2020-07-22 DIAGNOSIS — I10 ESSENTIAL (PRIMARY) HYPERTENSION: ICD-10-CM

## 2020-07-22 DIAGNOSIS — Z03.818 ENCOUNTER FOR OBSERVATION FOR SUSPECTED EXPOSURE TO OTHER BIOLOGICAL AGENTS RULED OUT: ICD-10-CM

## 2020-07-22 PROCEDURE — 99283 EMERGENCY DEPT VISIT LOW MDM: CPT | Mod: CS

## 2020-07-22 PROCEDURE — 99282 EMERGENCY DEPT VISIT SF MDM: CPT

## 2020-07-22 PROCEDURE — 99283 EMERGENCY DEPT VISIT LOW MDM: CPT

## 2020-07-22 PROCEDURE — U0003: CPT

## 2020-07-22 NOTE — ED STATDOCS - NSFOLLOWUPINSTRUCTIONS_ED_ALL_ED_FT
Pt here for COVID-19 testing. Pt non toxic. Pt was swabbed for COVID-19 in their car in drive through area. [facility] will call pt with results. return precautions given. Home self-quarantine recommended. Pt agrees with plan of  care.

## 2020-07-22 NOTE — ED STATDOCS - PATIENT PORTAL LINK FT
You can access the FollowMyHealth Patient Portal offered by [facility] by registering at the following website: [email or web address]. By joining [facility]s FollowMyHealth portal, you will also be able to view your health information using other applications (apps) compatible with our system.

## 2020-07-23 LAB — SARS-COV-2 RNA SPEC QL NAA+PROBE: SIGNIFICANT CHANGE UP

## 2020-10-27 ENCOUNTER — EMERGENCY (EMERGENCY)
Facility: HOSPITAL | Age: 70
LOS: 0 days | Discharge: HOME | End: 2020-10-27
Payer: MEDICARE

## 2020-10-27 VITALS
OXYGEN SATURATION: 99 % | SYSTOLIC BLOOD PRESSURE: 137 MMHG | HEIGHT: 66 IN | TEMPERATURE: 99 F | DIASTOLIC BLOOD PRESSURE: 76 MMHG | RESPIRATION RATE: 16 BRPM | HEART RATE: 69 BPM

## 2020-10-27 DIAGNOSIS — Z88.5 ALLERGY STATUS TO NARCOTIC AGENT: ICD-10-CM

## 2020-10-27 DIAGNOSIS — Z20.828 CONTACT WITH AND (SUSPECTED) EXPOSURE TO OTHER VIRAL COMMUNICABLE DISEASES: ICD-10-CM

## 2020-10-27 PROBLEM — I10 ESSENTIAL (PRIMARY) HYPERTENSION: Chronic | Status: ACTIVE | Noted: 2020-07-22

## 2020-10-27 LAB — SARS-COV-2 RNA SPEC QL NAA+PROBE: SIGNIFICANT CHANGE UP

## 2020-10-27 PROCEDURE — U0003: CPT

## 2020-10-27 PROCEDURE — 99282 EMERGENCY DEPT VISIT SF MDM: CPT

## 2020-10-27 PROCEDURE — 99283 EMERGENCY DEPT VISIT LOW MDM: CPT | Mod: CS

## 2020-10-27 PROCEDURE — 99283 EMERGENCY DEPT VISIT LOW MDM: CPT

## 2020-10-27 NOTE — ED ADULT TRIAGE NOTE - CHIEF COMPLAINT QUOTE
Patient comes in for covid testing. patient denies symptoms/exposure. Patient seen and d/perfecto by PA. See PA's note.

## 2020-10-27 NOTE — ED STATDOCS - PATIENT PORTAL LINK FT
You can access the FollowMyHealth Patient Portal offered by [facility] by registering at the following website: [email or web address]. By joining [employer]’s FollowMyHealth portal, you will also be able to view your health information using other applications (apps) compatible with our system.

## 2020-10-27 NOTE — ED STATDOCS - OBJECTIVE STATEMENT
68 y/o Male with PMHx of Subdural hematoma, presents to the ED with concern for Covid 19 prior to going to Dentist.  Pt denies symptoms including cough, fever, SOB, sore throat, diarrhea, loss of smell. Pt here for testing.

## 2021-07-30 ENCOUNTER — LABORATORY RESULT (OUTPATIENT)
Age: 71
End: 2021-07-30

## 2021-07-30 ENCOUNTER — APPOINTMENT (OUTPATIENT)
Dept: DISASTER EMERGENCY | Facility: CLINIC | Age: 71
End: 2021-07-30

## 2021-08-02 ENCOUNTER — OUTPATIENT (OUTPATIENT)
Dept: OUTPATIENT SERVICES | Facility: HOSPITAL | Age: 71
LOS: 1 days | Discharge: HOME | End: 2021-08-02
Payer: MEDICARE

## 2021-08-02 ENCOUNTER — RESULT REVIEW (OUTPATIENT)
Age: 71
End: 2021-08-02

## 2021-08-02 VITALS
SYSTOLIC BLOOD PRESSURE: 124 MMHG | TEMPERATURE: 97 F | HEART RATE: 63 BPM | WEIGHT: 205.03 LBS | RESPIRATION RATE: 27 BRPM | DIASTOLIC BLOOD PRESSURE: 76 MMHG | OXYGEN SATURATION: 100 % | HEIGHT: 66 IN

## 2021-08-02 DIAGNOSIS — Z98.890 OTHER SPECIFIED POSTPROCEDURAL STATES: Chronic | ICD-10-CM

## 2021-08-02 DIAGNOSIS — K21.9 GASTRO-ESOPHAGEAL REFLUX DISEASE WITHOUT ESOPHAGITIS: ICD-10-CM

## 2021-08-02 DIAGNOSIS — Z86.010 PERSONAL HISTORY OF COLONIC POLYPS: ICD-10-CM

## 2021-08-02 PROCEDURE — 88305 TISSUE EXAM BY PATHOLOGIST: CPT | Mod: 26

## 2021-08-02 PROCEDURE — 88305 TISSUE EXAM BY PATHOLOGIST: CPT

## 2021-08-02 PROCEDURE — C1889: CPT

## 2021-08-02 PROCEDURE — 88313 SPECIAL STAINS GROUP 2: CPT

## 2021-08-02 PROCEDURE — 88312 SPECIAL STAINS GROUP 1: CPT

## 2021-08-02 PROCEDURE — 88312 SPECIAL STAINS GROUP 1: CPT | Mod: 26

## 2021-08-02 PROCEDURE — 88313 SPECIAL STAINS GROUP 2: CPT | Mod: 26

## 2021-08-02 RX ORDER — ALFUZOSIN HYDROCHLORIDE 10 MG/1
0 TABLET, EXTENDED RELEASE ORAL
Qty: 0 | Refills: 0 | DISCHARGE

## 2021-08-02 RX ORDER — ALPRAZOLAM 0.25 MG
0 TABLET ORAL
Qty: 0 | Refills: 0 | DISCHARGE

## 2021-08-02 RX ORDER — PROPRANOLOL HCL 160 MG
1 CAPSULE, EXTENDED RELEASE 24HR ORAL
Qty: 0 | Refills: 0 | DISCHARGE

## 2021-08-02 NOTE — ASU PATIENT PROFILE, ADULT - PMH
Aortic valve sclerosis    BPH (benign prostatic hyperplasia)    HTN (hypertension)    Hyperlipemia    LVH (left ventricular hypertrophy)    Obesity    CASEY (obstructive sleep apnea)    Pulmonic regurgitation

## 2021-08-04 DIAGNOSIS — I35.0 NONRHEUMATIC AORTIC (VALVE) STENOSIS: ICD-10-CM

## 2021-08-04 DIAGNOSIS — E78.5 HYPERLIPIDEMIA, UNSPECIFIED: ICD-10-CM

## 2021-08-04 DIAGNOSIS — Z87.891 PERSONAL HISTORY OF NICOTINE DEPENDENCE: ICD-10-CM

## 2021-08-04 DIAGNOSIS — G47.33 OBSTRUCTIVE SLEEP APNEA (ADULT) (PEDIATRIC): ICD-10-CM

## 2021-08-04 DIAGNOSIS — I37.1 NONRHEUMATIC PULMONARY VALVE INSUFFICIENCY: ICD-10-CM

## 2021-08-04 DIAGNOSIS — Z99.89 DEPENDENCE ON OTHER ENABLING MACHINES AND DEVICES: ICD-10-CM

## 2021-08-04 DIAGNOSIS — K64.8 OTHER HEMORRHOIDS: ICD-10-CM

## 2021-08-04 DIAGNOSIS — N40.0 BENIGN PROSTATIC HYPERPLASIA WITHOUT LOWER URINARY TRACT SYMPTOMS: ICD-10-CM

## 2021-08-04 DIAGNOSIS — Z12.11 ENCOUNTER FOR SCREENING FOR MALIGNANT NEOPLASM OF COLON: ICD-10-CM

## 2021-08-04 DIAGNOSIS — K29.50 UNSPECIFIED CHRONIC GASTRITIS WITHOUT BLEEDING: ICD-10-CM

## 2021-08-04 DIAGNOSIS — D12.3 BENIGN NEOPLASM OF TRANSVERSE COLON: ICD-10-CM

## 2021-08-04 DIAGNOSIS — K62.1 RECTAL POLYP: ICD-10-CM

## 2021-08-04 DIAGNOSIS — E66.9 OBESITY, UNSPECIFIED: ICD-10-CM

## 2021-08-04 DIAGNOSIS — K22.70 BARRETT'S ESOPHAGUS WITHOUT DYSPLASIA: ICD-10-CM

## 2021-08-04 DIAGNOSIS — Z88.5 ALLERGY STATUS TO NARCOTIC AGENT: ICD-10-CM

## 2021-08-04 DIAGNOSIS — I10 ESSENTIAL (PRIMARY) HYPERTENSION: ICD-10-CM

## 2022-03-16 NOTE — ED ADULT NURSE NOTE - CHIEF COMPLAINT QUOTE
Pt comes in for covid testing. request from dentist. asymptomatic. Cosentyx Counseling:  I discussed with the patient the risks of Cosentyx including but not limited to worsening of Crohn's disease, immunosuppression, allergic reactions and infections.  The patient understands that monitoring is required including a PPD at baseline and must alert us or the primary physician if symptoms of infection or other concerning signs are noted.

## 2025-03-26 ENCOUNTER — APPOINTMENT (OUTPATIENT)
Facility: CLINIC | Age: 75
End: 2025-03-26
Payer: MEDICARE

## 2025-03-26 VITALS — WEIGHT: 195 LBS | BODY MASS INDEX: 31.34 KG/M2 | HEIGHT: 66 IN

## 2025-03-26 DIAGNOSIS — N40.0 BENIGN PROSTATIC HYPERPLASIA WITHOUT LOWER URINARY TRACT SYMPMS: ICD-10-CM

## 2025-03-26 DIAGNOSIS — M17.11 UNILATERAL PRIMARY OSTEOARTHRITIS, RIGHT KNEE: ICD-10-CM

## 2025-03-26 PROCEDURE — 99203 OFFICE O/P NEW LOW 30 MIN: CPT

## 2025-03-26 PROCEDURE — 73564 X-RAY EXAM KNEE 4 OR MORE: CPT | Mod: RT

## 2025-03-26 RX ORDER — TORSEMIDE 5 MG/1
TABLET ORAL
Refills: 0 | Status: ACTIVE | COMMUNITY

## 2025-03-26 RX ORDER — PROPRANOLOL HYDROCHLORIDE 160 MG/1
CAPSULE, EXTENDED RELEASE ORAL
Refills: 0 | Status: ACTIVE | COMMUNITY

## 2025-03-27 PROBLEM — M17.11 PRIMARY OSTEOARTHRITIS OF RIGHT KNEE: Status: ACTIVE | Noted: 2025-03-27
